# Patient Record
Sex: FEMALE | Race: WHITE | NOT HISPANIC OR LATINO | Employment: PART TIME | URBAN - METROPOLITAN AREA
[De-identification: names, ages, dates, MRNs, and addresses within clinical notes are randomized per-mention and may not be internally consistent; named-entity substitution may affect disease eponyms.]

---

## 2023-03-06 DIAGNOSIS — R92.8 ABNORMAL MAMMOGRAM: ICD-10-CM

## 2023-03-06 PROBLEM — R73.03 PREDIABETES: Status: ACTIVE | Noted: 2023-03-06

## 2023-03-06 RX ORDER — IBUPROFEN 600 MG/1
TABLET ORAL EVERY 6 HOURS
COMMUNITY
Start: 2021-08-19

## 2023-03-06 RX ORDER — ACETAMINOPHEN 325 MG/1
TABLET ORAL EVERY 6 HOURS
COMMUNITY
Start: 2021-08-19

## 2023-03-06 RX ORDER — DOCUSATE SODIUM 100 MG/1
CAPSULE, LIQUID FILLED ORAL 2 TIMES DAILY
COMMUNITY
Start: 2021-08-19 | End: 2023-08-02 | Stop reason: ALTCHOICE

## 2023-04-17 ENCOUNTER — APPOINTMENT (OUTPATIENT)
Dept: PEDIATRICS | Facility: CLINIC | Age: 43
End: 2023-04-17
Payer: COMMERCIAL

## 2023-07-14 ENCOUNTER — APPOINTMENT (OUTPATIENT)
Dept: PRIMARY CARE | Facility: CLINIC | Age: 43
End: 2023-07-14
Payer: COMMERCIAL

## 2023-08-02 ENCOUNTER — OFFICE VISIT (OUTPATIENT)
Dept: PRIMARY CARE | Facility: CLINIC | Age: 43
End: 2023-08-02
Payer: COMMERCIAL

## 2023-08-02 VITALS
BODY MASS INDEX: 41.78 KG/M2 | DIASTOLIC BLOOD PRESSURE: 80 MMHG | SYSTOLIC BLOOD PRESSURE: 110 MMHG | HEART RATE: 75 BPM | WEIGHT: 260 LBS | HEIGHT: 66 IN | OXYGEN SATURATION: 98 % | TEMPERATURE: 97.5 F

## 2023-08-02 DIAGNOSIS — J01.10 ACUTE NON-RECURRENT FRONTAL SINUSITIS: Primary | ICD-10-CM

## 2023-08-02 DIAGNOSIS — R92.8 OTHER ABNORMAL AND INCONCLUSIVE FINDINGS ON DIAGNOSTIC IMAGING OF BREAST: ICD-10-CM

## 2023-08-02 DIAGNOSIS — R73.03 PREDIABETES: ICD-10-CM

## 2023-08-02 DIAGNOSIS — Z00.00 ROUTINE GENERAL MEDICAL EXAMINATION AT A HEALTH CARE FACILITY: ICD-10-CM

## 2023-08-02 PROBLEM — M76.72 PERONEAL TENDINITIS OF LEFT LOWER LEG: Status: ACTIVE | Noted: 2023-08-02

## 2023-08-02 PROBLEM — M79.672 FOOT PAIN, BILATERAL: Status: ACTIVE | Noted: 2023-08-02

## 2023-08-02 PROBLEM — G57.82 NEURITIS OF LEFT SURAL NERVE: Status: ACTIVE | Noted: 2023-08-02

## 2023-08-02 PROBLEM — H61.21 IMPACTED CERUMEN OF RIGHT EAR: Status: ACTIVE | Noted: 2023-08-02

## 2023-08-02 PROBLEM — M79.671 FOOT PAIN, BILATERAL: Status: ACTIVE | Noted: 2023-08-02

## 2023-08-02 PROBLEM — M72.2 PLANTAR FASCIITIS: Status: ACTIVE | Noted: 2023-08-02

## 2023-08-02 PROCEDURE — 1036F TOBACCO NON-USER: CPT | Performed by: FAMILY MEDICINE

## 2023-08-02 PROCEDURE — 99396 PREV VISIT EST AGE 40-64: CPT | Performed by: FAMILY MEDICINE

## 2023-08-02 RX ORDER — AMOXICILLIN AND CLAVULANATE POTASSIUM 875; 125 MG/1; MG/1
875 TABLET, FILM COATED ORAL 2 TIMES DAILY
Qty: 20 TABLET | Refills: 0 | Status: SHIPPED | OUTPATIENT
Start: 2023-08-02 | End: 2023-08-12

## 2023-08-02 RX ORDER — PERPHENAZINE 16 MG
TABLET ORAL
COMMUNITY
Start: 2023-06-06 | End: 2023-08-02 | Stop reason: ALTCHOICE

## 2023-08-02 ASSESSMENT — PATIENT HEALTH QUESTIONNAIRE - PHQ9
SUM OF ALL RESPONSES TO PHQ9 QUESTIONS 1 & 2: 0
2. FEELING DOWN, DEPRESSED OR HOPELESS: NOT AT ALL
1. LITTLE INTEREST OR PLEASURE IN DOING THINGS: NOT AT ALL

## 2023-08-02 ASSESSMENT — PAIN SCALES - GENERAL: PAINLEVEL: 0-NO PAIN

## 2023-08-02 ASSESSMENT — LIFESTYLE VARIABLES
HOW OFTEN DO YOU HAVE SIX OR MORE DRINKS ON ONE OCCASION: NEVER
HOW OFTEN DO YOU HAVE A DRINK CONTAINING ALCOHOL: NEVER

## 2023-08-02 NOTE — PROGRESS NOTES
Subjective   Patient ID: Marilynn Nesbitt is a 43 y.o. female who presents for Sore Throat (Patient is here for sore throat x 5 days cough. Patient tested positive for Covid on July 11th and then over the weekend she started not to feel good again she tested this morning and it was NEG).  HPI  43-year-old female with bilateral sinus pressure, cough, congestion approximately 5 to 7 days.  Symptoms constant.  Symptoms getting worse.  She is also due for health maintenance blood work and mammogram.  She missed her original appointment due to COVID.  Review of Systems  Constitutional: no chills, no fever and no night sweats.   Eyes: no blurred vision and no eyesight problems.   ENT: the ears feel full, nasal congestion, postnasal drip and sinus pressure, but no tinnitus, no vertigo and no sore throat.   Cardiovascular: no chest pain, no intermittent leg claudication, no lower extremity edema, no palpitations and no syncope.   Respiratory: productive cough, no shortness of breath during exertion, no shortness of breath at rest and no wheezing.   Musculoskeletal: no arthralgias and no myalgias.   Neurological: no headache.   Hematologic/Lymphatic: no recurrent infections and no swollen glands.  Visit Vitals  /80 (BP Location: Left arm, Patient Position: Sitting, BP Cuff Size: Adult)   Pulse 75   Temp 36.4 °C (97.5 °F) (Temporal)        Objective   Physical Exam  Constitutional: Alert and in no acute distress. Well developed, well nourished.   Head and Face: Palpation of the face and sinuses: Abnormal.  Examination of the Sinuses: right maxillary tenderness, left maxillary tenderness, right frontal tenderness and left frontal tenderness.   Eyes: Pupils were equal in size, round, reactive to light (PERRL) with normal accommodation and extraocular movements intact (EOMI).   Ears, Nose, Mouth, and Throat: Otoscopic examination: Normal. Oropharynx: Abnormal.  Postnasal drainage.   Cardiovascular: Heart rate and rhythm were  normal, normal S1 and S2, no gallops, no murmurs and no pericardial rub.   Pulmonary: No respiratory distress. Clear bilateral breath sounds.   Lymphatic: No cervical lymphadenopathy.  Assessment/Plan   Problem List Items Addressed This Visit       Prediabetes    Relevant Orders    Hemoglobin A1c    Acute non-recurrent frontal sinusitis - Primary    Relevant Medications    amoxicillin-pot clavulanate (Augmentin) 875-125 mg tablet    Routine general medical examination at a health care facility    Relevant Orders    Lipid Panel    Comprehensive Metabolic Panel    CBC and Auto Differential    Urinalysis with Reflex Microscopic    BI mammo bilateral screening tomosynthesis

## 2023-08-30 ENCOUNTER — LAB (OUTPATIENT)
Dept: LAB | Facility: LAB | Age: 43
End: 2023-08-30
Payer: COMMERCIAL

## 2023-08-30 DIAGNOSIS — R92.8 ABNORMAL MAMMOGRAM: Primary | ICD-10-CM

## 2023-08-30 DIAGNOSIS — R73.03 PREDIABETES: ICD-10-CM

## 2023-08-30 DIAGNOSIS — Z00.00 ROUTINE GENERAL MEDICAL EXAMINATION AT A HEALTH CARE FACILITY: ICD-10-CM

## 2023-08-30 LAB
ALANINE AMINOTRANSFERASE (SGPT) (U/L) IN SER/PLAS: 14 U/L (ref 7–45)
ALBUMIN (G/DL) IN SER/PLAS: 4.2 G/DL (ref 3.4–5)
ALKALINE PHOSPHATASE (U/L) IN SER/PLAS: 59 U/L (ref 33–110)
AMORPHOUS CRYSTALS, URINE: ABNORMAL /HPF
ANION GAP IN SER/PLAS: 12 MMOL/L (ref 10–20)
APPEARANCE, URINE: ABNORMAL
ASPARTATE AMINOTRANSFERASE (SGOT) (U/L) IN SER/PLAS: 13 U/L (ref 9–39)
BACTERIA, URINE: ABNORMAL /HPF
BASOPHILS (10*3/UL) IN BLOOD BY AUTOMATED COUNT: 0.05 X10E9/L (ref 0–0.1)
BASOPHILS/100 LEUKOCYTES IN BLOOD BY AUTOMATED COUNT: 0.6 % (ref 0–2)
BILIRUBIN TOTAL (MG/DL) IN SER/PLAS: 0.6 MG/DL (ref 0–1.2)
BILIRUBIN, URINE: NEGATIVE
BLOOD, URINE: ABNORMAL
CALCIUM (MG/DL) IN SER/PLAS: 9.2 MG/DL (ref 8.6–10.3)
CARBON DIOXIDE, TOTAL (MMOL/L) IN SER/PLAS: 28 MMOL/L (ref 21–32)
CHLORIDE (MMOL/L) IN SER/PLAS: 101 MMOL/L (ref 98–107)
CHOLESTEROL (MG/DL) IN SER/PLAS: 139 MG/DL (ref 0–199)
CHOLESTEROL IN HDL (MG/DL) IN SER/PLAS: 60.7 MG/DL
CHOLESTEROL/HDL RATIO: 2.3
COLOR, URINE: ABNORMAL
CREATININE (MG/DL) IN SER/PLAS: 0.84 MG/DL (ref 0.5–1.05)
EOSINOPHILS (10*3/UL) IN BLOOD BY AUTOMATED COUNT: 0.13 X10E9/L (ref 0–0.7)
EOSINOPHILS/100 LEUKOCYTES IN BLOOD BY AUTOMATED COUNT: 1.5 % (ref 0–6)
ERYTHROCYTE DISTRIBUTION WIDTH (RATIO) BY AUTOMATED COUNT: 13.6 % (ref 11.5–14.5)
ERYTHROCYTE MEAN CORPUSCULAR HEMOGLOBIN CONCENTRATION (G/DL) BY AUTOMATED: 32.5 G/DL (ref 32–36)
ERYTHROCYTE MEAN CORPUSCULAR VOLUME (FL) BY AUTOMATED COUNT: 84 FL (ref 80–100)
ERYTHROCYTES (10*6/UL) IN BLOOD BY AUTOMATED COUNT: 4.68 X10E12/L (ref 4–5.2)
ESTIMATED AVERAGE GLUCOSE FOR HBA1C: 140 MG/DL
GFR FEMALE: 89 ML/MIN/1.73M2
GLUCOSE (MG/DL) IN SER/PLAS: 152 MG/DL (ref 74–99)
GLUCOSE, URINE: NEGATIVE MG/DL
HEMATOCRIT (%) IN BLOOD BY AUTOMATED COUNT: 39.4 % (ref 36–46)
HEMOGLOBIN (G/DL) IN BLOOD: 12.8 G/DL (ref 12–16)
HEMOGLOBIN A1C/HEMOGLOBIN TOTAL IN BLOOD: 6.5 %
IMMATURE GRANULOCYTES/100 LEUKOCYTES IN BLOOD BY AUTOMATED COUNT: 0.6 % (ref 0–0.9)
KETONES, URINE: NEGATIVE MG/DL
LDL: 60 MG/DL (ref 0–99)
LEUKOCYTE ESTERASE, URINE: ABNORMAL
LEUKOCYTES (10*3/UL) IN BLOOD BY AUTOMATED COUNT: 8.7 X10E9/L (ref 4.4–11.3)
LYMPHOCYTES (10*3/UL) IN BLOOD BY AUTOMATED COUNT: 2.27 X10E9/L (ref 1.2–4.8)
LYMPHOCYTES/100 LEUKOCYTES IN BLOOD BY AUTOMATED COUNT: 26.1 % (ref 13–44)
MONOCYTES (10*3/UL) IN BLOOD BY AUTOMATED COUNT: 0.56 X10E9/L (ref 0.1–1)
MONOCYTES/100 LEUKOCYTES IN BLOOD BY AUTOMATED COUNT: 6.4 % (ref 2–10)
MUCUS, URINE: ABNORMAL /LPF
NEUTROPHILS (10*3/UL) IN BLOOD BY AUTOMATED COUNT: 5.63 X10E9/L (ref 1.2–7.7)
NEUTROPHILS/100 LEUKOCYTES IN BLOOD BY AUTOMATED COUNT: 64.8 % (ref 40–80)
NITRITE, URINE: NEGATIVE
NRBC (PER 100 WBCS) BY AUTOMATED COUNT: 0 /100 WBC (ref 0–0)
PH, URINE: 7 (ref 5–8)
PLATELETS (10*3/UL) IN BLOOD AUTOMATED COUNT: 346 X10E9/L (ref 150–450)
POTASSIUM (MMOL/L) IN SER/PLAS: 4.1 MMOL/L (ref 3.5–5.3)
PROTEIN TOTAL: 6.8 G/DL (ref 6.4–8.2)
PROTEIN, URINE: ABNORMAL MG/DL
RBC, URINE: 6 /HPF (ref 0–5)
SODIUM (MMOL/L) IN SER/PLAS: 137 MMOL/L (ref 136–145)
SPECIFIC GRAVITY, URINE: 1.01 (ref 1–1.03)
SQUAMOUS EPITHELIAL CELLS, URINE: 12 /HPF
TRIGLYCERIDE (MG/DL) IN SER/PLAS: 93 MG/DL (ref 0–149)
UREA NITROGEN (MG/DL) IN SER/PLAS: 10 MG/DL (ref 6–23)
UROBILINOGEN, URINE: <2 MG/DL (ref 0–1.9)
VLDL: 19 MG/DL (ref 0–40)
WBC CLUMPS, URINE: ABNORMAL /HPF
WBC, URINE: 20 /HPF (ref 0–5)

## 2023-08-30 PROCEDURE — 81001 URINALYSIS AUTO W/SCOPE: CPT

## 2023-08-30 PROCEDURE — 83036 HEMOGLOBIN GLYCOSYLATED A1C: CPT

## 2023-08-30 PROCEDURE — 80061 LIPID PANEL: CPT

## 2023-08-30 PROCEDURE — 36415 COLL VENOUS BLD VENIPUNCTURE: CPT

## 2023-08-30 PROCEDURE — 80053 COMPREHEN METABOLIC PANEL: CPT

## 2023-08-30 PROCEDURE — 85025 COMPLETE CBC W/AUTO DIFF WBC: CPT

## 2023-09-01 ENCOUNTER — TELEPHONE (OUTPATIENT)
Dept: PRIMARY CARE | Facility: CLINIC | Age: 43
End: 2023-09-01
Payer: COMMERCIAL

## 2023-09-01 NOTE — TELEPHONE ENCOUNTER
Pt called and would like for you to give her a call when you get back into the office. This is regarding a lump on hr breast that they have been checking on for a year now.

## 2023-11-06 ENCOUNTER — APPOINTMENT (OUTPATIENT)
Dept: OTOLARYNGOLOGY | Facility: CLINIC | Age: 43
End: 2023-11-06
Payer: COMMERCIAL

## 2023-11-22 ENCOUNTER — OFFICE VISIT (OUTPATIENT)
Dept: OTOLARYNGOLOGY | Facility: CLINIC | Age: 43
End: 2023-11-22
Payer: COMMERCIAL

## 2023-11-22 VITALS
DIASTOLIC BLOOD PRESSURE: 94 MMHG | HEART RATE: 86 BPM | BODY MASS INDEX: 42.43 KG/M2 | TEMPERATURE: 97.9 F | WEIGHT: 264 LBS | OXYGEN SATURATION: 99 % | HEIGHT: 66 IN | RESPIRATION RATE: 16 BRPM | SYSTOLIC BLOOD PRESSURE: 147 MMHG

## 2023-11-22 DIAGNOSIS — H61.23 EXCESSIVE CERUMEN IN BOTH EAR CANALS: ICD-10-CM

## 2023-11-22 DIAGNOSIS — H93.8X3 SENSATION OF PLUGGED EAR ON BOTH SIDES: Primary | ICD-10-CM

## 2023-11-22 DIAGNOSIS — L29.9 EAR ITCHING: ICD-10-CM

## 2023-11-22 PROCEDURE — 1036F TOBACCO NON-USER: CPT | Performed by: NURSE PRACTITIONER

## 2023-11-22 PROCEDURE — 99213 OFFICE O/P EST LOW 20 MIN: CPT | Performed by: NURSE PRACTITIONER

## 2023-11-22 ASSESSMENT — PAIN SCALES - GENERAL: PAINLEVEL: 0-NO PAIN

## 2023-11-22 ASSESSMENT — PATIENT HEALTH QUESTIONNAIRE - PHQ9
SUM OF ALL RESPONSES TO PHQ9 QUESTIONS 1 AND 2: 0
1. LITTLE INTEREST OR PLEASURE IN DOING THINGS: NOT AT ALL
2. FEELING DOWN, DEPRESSED OR HOPELESS: NOT AT ALL

## 2023-11-22 ASSESSMENT — COLUMBIA-SUICIDE SEVERITY RATING SCALE - C-SSRS
6. HAVE YOU EVER DONE ANYTHING, STARTED TO DO ANYTHING, OR PREPARED TO DO ANYTHING TO END YOUR LIFE?: NO
1. IN THE PAST MONTH, HAVE YOU WISHED YOU WERE DEAD OR WISHED YOU COULD GO TO SLEEP AND NOT WAKE UP?: NO
2. HAVE YOU ACTUALLY HAD ANY THOUGHTS OF KILLING YOURSELF?: NO

## 2023-11-22 ASSESSMENT — ENCOUNTER SYMPTOMS
DEPRESSION: 0
OCCASIONAL FEELINGS OF UNSTEADINESS: 0
LOSS OF SENSATION IN FEET: 0

## 2023-11-22 NOTE — PROGRESS NOTES
"Subjective   Patient ID: Marilynn Nesbitt is a 43 y.o. female who presents for ear cleaning.    HPI  Patient here following up for her ears. She last had her right ear cleaned in November 2022. Ears have been itching and she has noticed some ear wax. The ears feel \"stuffy\". No ear pain.  History of ear tubes.     Patient Active Problem List   Diagnosis    Abnormal mammogram    Prediabetes    Foot pain, bilateral    Impacted cerumen of right ear    Neuritis of left sural nerve    Peroneal tendinitis of left lower leg    Plantar fasciitis    Acute non-recurrent frontal sinusitis    Routine general medical examination at a health care facility     Past Surgical History:   Procedure Laterality Date    BACK SURGERY  10/28/2019    Back Surgery    OTHER SURGICAL HISTORY  01/15/2015    Laparoscopy Myomectomy    OTHER SURGICAL HISTORY  09/02/2020    Gallbladder surgery    OTHER SURGICAL HISTORY  09/02/2020    Hand surgery     Review of Systems    All other systems have been reviewed and are negative for complaints except for those mentioned in history of present illness, past medical history and problem list.    Objective   Physical Exam  Right Ear: External inspection of ear with no deformity, scars, or masses. Ear canal is with non-occluding cerumen that was removed using the microscope, suction, and alligator forceps. After removal, TM is intact with no sign of infection, effusion, or retraction.      Left Ear: External inspection of ear with no deformity, scars, or masses. Ear canal is with non-occluding cerumen that was removed using the microscope, suction, and alligator forceps. After removal, TM is intact with no sign of infection, effusion, or retraction.      Assessment/Plan   Diagnoses and all orders for this visit:  Sensation of plugged ear on both sides, Excessive cerumen in both ear canals  Mild amounts of cerumen removed bilaterally. Reassurance given there is no cerumen impaction or infection. I recommend " obtaining an audiogram.   Ear itching  Use sweet oil or mineral oil in the ears 1-2 times weekly to help moisturize the ear canal and prevent itching.    Patient may follow up as needed.   All questions answered to patient satisfaction.

## 2024-02-19 ASSESSMENT — DERMATOLOGY QUALITY OF LIFE (QOL) ASSESSMENT
RATE HOW EMOTIONALLY BOTHERED YOU ARE BY YOUR SKIN PROBLEM (FOR EXAMPLE, WORRY, EMBARRASSMENT, FRUSTRATION): 3
WHAT SINGLE SKIN CONDITION LISTED BELOW IS THE PATIENT ANSWERING THE QUALITY-OF-LIFE ASSESSMENT QUESTIONS ABOUT: NONE OF THE ABOVE
WHAT SINGLE SKIN CONDITION LISTED BELOW IS THE PATIENT ANSWERING THE QUALITY-OF-LIFE ASSESSMENT QUESTIONS ABOUT: NONE OF THE ABOVE
RATE HOW BOTHERED YOU ARE BY EFFECTS OF YOUR SKIN PROBLEMS ON YOUR ACTIVITIES (EG, GOING OUT, ACCOMPLISHING WHAT YOU WANT, WORK ACTIVITIES OR YOUR RELATIONSHIPS WITH OTHERS): 0 - NEVER BOTHERED
RATE HOW BOTHERED YOU ARE BY SYMPTOMS OF YOUR SKIN PROBLEM (EG, ITCHING, STINGING BURNING, HURTING OR SKIN IRRITATION): 6 - ALWAYS BOTHERED
DATE THE QUALITY-OF-LIFE ASSESSMENT WAS COMPLETED: 66889
RATE HOW EMOTIONALLY BOTHERED YOU ARE BY YOUR SKIN PROBLEM (FOR EXAMPLE, WORRY, EMBARRASSMENT, FRUSTRATION): 3
RATE HOW BOTHERED YOU ARE BY EFFECTS OF YOUR SKIN PROBLEMS ON YOUR ACTIVITIES (EG, GOING OUT, ACCOMPLISHING WHAT YOU WANT, WORK ACTIVITIES OR YOUR RELATIONSHIPS WITH OTHERS): 0 - NEVER BOTHERED
DATE THE QUALITY-OF-LIFE ASSESSMENT WAS COMPLETED: 02/19/2024
RATE HOW BOTHERED YOU ARE BY SYMPTOMS OF YOUR SKIN PROBLEM (EG, ITCHING, STINGING BURNING, HURTING OR SKIN IRRITATION): 6 - ALWAYS BOTHERED

## 2024-02-20 ENCOUNTER — OFFICE VISIT (OUTPATIENT)
Dept: DERMATOLOGY | Facility: CLINIC | Age: 44
End: 2024-02-20
Payer: COMMERCIAL

## 2024-02-20 DIAGNOSIS — R21 RASH AND OTHER NONSPECIFIC SKIN ERUPTION: ICD-10-CM

## 2024-02-20 DIAGNOSIS — L30.9 DERMATITIS: Primary | ICD-10-CM

## 2024-02-20 PROCEDURE — 1036F TOBACCO NON-USER: CPT | Performed by: NURSE PRACTITIONER

## 2024-02-20 PROCEDURE — 99213 OFFICE O/P EST LOW 20 MIN: CPT | Performed by: NURSE PRACTITIONER

## 2024-02-20 RX ORDER — HYDROCORTISONE 1 %
CREAM (GRAM) TOPICAL 2 TIMES DAILY
COMMUNITY

## 2024-02-20 RX ORDER — TRIAMCINOLONE ACETONIDE 1 MG/G
CREAM TOPICAL
Qty: 453 G | Refills: 3 | Status: SHIPPED | OUTPATIENT
Start: 2024-02-20

## 2024-02-20 NOTE — PROGRESS NOTES
Subjective     Marilynn Nesbitt is a 43 y.o. female who presents for the following: Rash (Pt presents for examination of generalized rash. She states it is itching and red. She states she has been using hydrocortisone cream with minimal response. ).     Review of Systems:  No other skin or systemic complaints other than what is documented elsewhere in the note.    The following portions of the chart were reviewed this encounter and updated as appropriate:         Skin Cancer History  No skin cancer on file.      Specialty Problems    None       Objective   Well appearing patient in no apparent distress; mood and affect are within normal limits.    A full examination was performed including scalp, head, eyes, ears, nose, lips, neck, chest, axillae, abdomen, back, buttocks, bilateral upper extremities, bilateral lower extremities, hands, feet, fingers, toes, fingernails, and toenails. All findings within normal limits unless otherwise noted below.    Assessment/Plan   1. Dermatitis  Scaly erythematous papules and plaques with edema and vesiculation in a distribution consistent with contact to new laundry detergent. Worse on trunk and upper extremities.     - Irritant contact dermatitis is an inflammatory rash caused by direct physical or chemical injury to the skin. Unlike allergic contact dermatitis, which appears 24-72 hours after exposure to an allergen, the symptoms of irritant contact dermatitis can result within a few hours, especially with exposure to a strong irritant.  - Most individuals notice a burning or stinging sensation shortly after exposure to the irritant as well as a rash. The rash may then become itchy.  - Common irritants causing irritant contact dermatitis include soaps and detergents, solvents, mild acids, and chemicals with a high pH (alkalis), fiberglass, hair products, bleach, rubber gloves, and plants.  Try to avoid further exposure to the irritant, if known, or protect the skin from  re-exposure.  For irritated skin in body folds, consider applying a barrier cream with zinc oxide paste, such as Desitin.  - Apply petroleum jelly (eg, Vaseline) or a thick moisturizing cream (eg, CeraVe moisturizing cream) to wet skin after bathing or washing. Reapply at least twice daily to help further protect the affected skin areas.  - Start triamcinolone cream, use as directed.   - Please call the office if the condition worsens or fails to resolve.

## 2024-07-30 ENCOUNTER — APPOINTMENT (OUTPATIENT)
Dept: PRIMARY CARE | Facility: CLINIC | Age: 44
End: 2024-07-30
Payer: COMMERCIAL

## 2024-08-23 ENCOUNTER — LAB (OUTPATIENT)
Dept: LAB | Facility: LAB | Age: 44
End: 2024-08-23
Payer: COMMERCIAL

## 2024-08-23 ENCOUNTER — APPOINTMENT (OUTPATIENT)
Dept: PRIMARY CARE | Facility: CLINIC | Age: 44
End: 2024-08-23
Payer: COMMERCIAL

## 2024-08-23 VITALS — WEIGHT: 265 LBS | SYSTOLIC BLOOD PRESSURE: 110 MMHG | BODY MASS INDEX: 42.77 KG/M2 | DIASTOLIC BLOOD PRESSURE: 90 MMHG

## 2024-08-23 DIAGNOSIS — Z00.00 HEALTH CARE MAINTENANCE: ICD-10-CM

## 2024-08-23 DIAGNOSIS — E55.9 VITAMIN D DEFICIENCY: ICD-10-CM

## 2024-08-23 DIAGNOSIS — Z12.39 ENCOUNTER FOR SCREENING FOR MALIGNANT NEOPLASM OF BREAST, UNSPECIFIED SCREENING MODALITY: ICD-10-CM

## 2024-08-23 DIAGNOSIS — E66.01 CLASS 3 SEVERE OBESITY DUE TO EXCESS CALORIES WITHOUT SERIOUS COMORBIDITY IN ADULT, UNSPECIFIED BMI (MULTI): ICD-10-CM

## 2024-08-23 DIAGNOSIS — E66.01 CLASS 3 SEVERE OBESITY DUE TO EXCESS CALORIES WITHOUT SERIOUS COMORBIDITY IN ADULT, UNSPECIFIED BMI (MULTI): Primary | ICD-10-CM

## 2024-08-23 DIAGNOSIS — L30.4 INTERTRIGO: ICD-10-CM

## 2024-08-23 PROBLEM — E66.813 CLASS 3 SEVERE OBESITY DUE TO EXCESS CALORIES WITHOUT SERIOUS COMORBIDITY IN ADULT: Status: ACTIVE | Noted: 2024-08-23

## 2024-08-23 LAB
25(OH)D3 SERPL-MCNC: 32 NG/ML (ref 30–100)
ALBUMIN SERPL BCP-MCNC: 4.3 G/DL (ref 3.4–5)
ALP SERPL-CCNC: 73 U/L (ref 33–110)
ALT SERPL W P-5'-P-CCNC: 13 U/L (ref 7–45)
ANION GAP SERPL CALC-SCNC: 13 MMOL/L (ref 10–20)
AST SERPL W P-5'-P-CCNC: 11 U/L (ref 9–39)
BILIRUB SERPL-MCNC: 0.5 MG/DL (ref 0–1.2)
BUN SERPL-MCNC: 14 MG/DL (ref 6–23)
CALCIUM SERPL-MCNC: 9.4 MG/DL (ref 8.6–10.6)
CHLORIDE SERPL-SCNC: 104 MMOL/L (ref 98–107)
CHOLEST SERPL-MCNC: 141 MG/DL (ref 0–199)
CHOLESTEROL/HDL RATIO: 2.7
CO2 SERPL-SCNC: 25 MMOL/L (ref 21–32)
CREAT SERPL-MCNC: 0.78 MG/DL (ref 0.5–1.05)
EGFRCR SERPLBLD CKD-EPI 2021: >90 ML/MIN/1.73M*2
ERYTHROCYTE [DISTWIDTH] IN BLOOD BY AUTOMATED COUNT: 13.7 % (ref 11.5–14.5)
EST. AVERAGE GLUCOSE BLD GHB EST-MCNC: 126 MG/DL
GLUCOSE SERPL-MCNC: 94 MG/DL (ref 74–99)
HBA1C MFR BLD: 6 %
HCT VFR BLD AUTO: 40.6 % (ref 36–46)
HDLC SERPL-MCNC: 52.9 MG/DL
HGB BLD-MCNC: 12.9 G/DL (ref 12–16)
LDLC SERPL CALC-MCNC: 67 MG/DL
MCH RBC QN AUTO: 27 PG (ref 26–34)
MCHC RBC AUTO-ENTMCNC: 31.8 G/DL (ref 32–36)
MCV RBC AUTO: 85 FL (ref 80–100)
NON HDL CHOLESTEROL: 88 MG/DL (ref 0–149)
NRBC BLD-RTO: 0 /100 WBCS (ref 0–0)
PLATELET # BLD AUTO: 365 X10*3/UL (ref 150–450)
POTASSIUM SERPL-SCNC: 4.1 MMOL/L (ref 3.5–5.3)
PROT SERPL-MCNC: 7.3 G/DL (ref 6.4–8.2)
RBC # BLD AUTO: 4.77 X10*6/UL (ref 4–5.2)
SODIUM SERPL-SCNC: 138 MMOL/L (ref 136–145)
TRIGL SERPL-MCNC: 107 MG/DL (ref 0–149)
TSH SERPL-ACNC: 1.46 MIU/L (ref 0.44–3.98)
VLDL: 21 MG/DL (ref 0–40)
WBC # BLD AUTO: 8.6 X10*3/UL (ref 4.4–11.3)

## 2024-08-23 PROCEDURE — 36415 COLL VENOUS BLD VENIPUNCTURE: CPT

## 2024-08-23 PROCEDURE — 99386 PREV VISIT NEW AGE 40-64: CPT | Performed by: STUDENT IN AN ORGANIZED HEALTH CARE EDUCATION/TRAINING PROGRAM

## 2024-08-23 PROCEDURE — 1036F TOBACCO NON-USER: CPT | Performed by: STUDENT IN AN ORGANIZED HEALTH CARE EDUCATION/TRAINING PROGRAM

## 2024-08-23 RX ORDER — NYSTATIN 100000 [USP'U]/G
1 POWDER TOPICAL 2 TIMES DAILY
Qty: 60 G | Refills: 1 | Status: SHIPPED | OUTPATIENT
Start: 2024-08-23 | End: 2025-08-23

## 2024-08-23 ASSESSMENT — PATIENT HEALTH QUESTIONNAIRE - PHQ9
1. LITTLE INTEREST OR PLEASURE IN DOING THINGS: NOT AT ALL
2. FEELING DOWN, DEPRESSED OR HOPELESS: NOT AT ALL
SUM OF ALL RESPONSES TO PHQ9 QUESTIONS 1 AND 2: 0

## 2024-08-23 ASSESSMENT — ENCOUNTER SYMPTOMS
GASTROINTESTINAL NEGATIVE: 1
PSYCHIATRIC NEGATIVE: 1
RESPIRATORY NEGATIVE: 1
CARDIOVASCULAR NEGATIVE: 1
CONSTITUTIONAL NEGATIVE: 1
MUSCULOSKELETAL NEGATIVE: 1

## 2024-08-23 NOTE — PROGRESS NOTES
Jillian ew and general check up and needs an order for mammogram      Subjective   Patient ID: Marilynn Nesbitt is a 44 y.o. female.    Patient is a 44-year-old female who regards past medical history of gestational diabetes however reports no other history who presents for establish care.  She regards that she is overall fairly healthy, and is currently not on any medications.  Was on metformin in the past for gestational diabetes however has been weaned off.  Otherwise, regards that she is overall doing well with no complaints.  States that she does need a referral for a mammogram as well as some routine blood work.  Intermittently does get rashes, and is itchy throughout the day.  Otherwise states no additional issues or concerns.      Past medical history as above  Past surgical history denies  Social history denies any alcohol drug tobacco use  Family history diabetes        Review of Systems   Constitutional: Negative.    HENT: Negative.     Respiratory: Negative.     Cardiovascular: Negative.    Gastrointestinal: Negative.    Musculoskeletal: Negative.    Psychiatric/Behavioral: Negative.         Objective Visit Vitals  /90   Wt 120 kg (265 lb)   BMI 42.77 kg/m²   Smoking Status Never   BSA 2.36 m²      Physical Exam  Constitutional:       General: She is not in acute distress.     Appearance: She is not ill-appearing.   Eyes:      Pupils: Pupils are equal, round, and reactive to light.   Cardiovascular:      Rate and Rhythm: Normal rate and regular rhythm.      Pulses: Normal pulses.      Heart sounds: No murmur heard.  Pulmonary:      Effort: No respiratory distress.      Breath sounds: No wheezing.   Abdominal:      General: Abdomen is flat. Bowel sounds are normal. There is no distension.   Musculoskeletal:      Right lower leg: No edema.      Left lower leg: No edema.   Skin:     General: Skin is warm and dry.   Neurological:      Mental Status: She is alert. Mental status is at baseline.       Cranial Nerves: No cranial nerve deficit.      Motor: No weakness.   Psychiatric:         Mood and Affect: Mood normal.         Behavior: Behavior normal.         Assessment/Plan   Diagnoses and all orders for this visit:  Class 3 severe obesity due to excess calories without serious comorbidity in adult, unspecified BMI (Multi)  -     TSH with reflex to Free T4 if abnormal; Future  -     Hemoglobin A1C; Future  Vitamin D deficiency  -     Vitamin D 25-Hydroxy,Total (for eval of Vitamin D levels); Future  Encounter for screening for malignant neoplasm of breast, unspecified screening modality  -     BI mammo bilateral screening tomosynthesis; Future  Health care maintenance  -     CBC; Future  -     Comprehensive Metabolic Panel; Future  -     Lipid panel; Future  Intertrigo  -     nystatin (Mycostatin) 100,000 unit/gram powder; Apply 1 Application topically 2 times a day.    Patient seen for establish care    #Obesity  Discussed diet and excise modifications    #Abnormal mammograms  Previously tentative plans for routine screenings today advise [facility] referral    #Itching  Suspect underlying intertrigo, recommend nystatin, as needed Benadryl Derm referral if no improvement      #Health maintenance  Advised age-appropriate vaccinations  Routine labs today  Mammogram referral  [facility] referral      Return to care yearly or as needed

## 2024-08-31 ENCOUNTER — HOSPITAL ENCOUNTER (OUTPATIENT)
Dept: RADIOLOGY | Facility: CLINIC | Age: 44
Discharge: HOME | End: 2024-08-31
Payer: COMMERCIAL

## 2024-08-31 VITALS — HEIGHT: 66 IN | BODY MASS INDEX: 42.59 KG/M2 | WEIGHT: 265 LBS

## 2024-08-31 DIAGNOSIS — Z12.39 ENCOUNTER FOR SCREENING FOR MALIGNANT NEOPLASM OF BREAST, UNSPECIFIED SCREENING MODALITY: ICD-10-CM

## 2024-08-31 PROCEDURE — 77067 SCR MAMMO BI INCL CAD: CPT | Performed by: RADIOLOGY

## 2024-08-31 PROCEDURE — 77063 BREAST TOMOSYNTHESIS BI: CPT | Performed by: RADIOLOGY

## 2024-08-31 PROCEDURE — 77067 SCR MAMMO BI INCL CAD: CPT

## 2024-09-10 ENCOUNTER — OFFICE VISIT (OUTPATIENT)
Dept: PRIMARY CARE | Facility: CLINIC | Age: 44
End: 2024-09-10
Payer: COMMERCIAL

## 2024-09-10 VITALS — DIASTOLIC BLOOD PRESSURE: 80 MMHG | WEIGHT: 267 LBS | SYSTOLIC BLOOD PRESSURE: 126 MMHG | BODY MASS INDEX: 43.09 KG/M2

## 2024-09-10 DIAGNOSIS — L03.90 CELLULITIS, UNSPECIFIED CELLULITIS SITE: Primary | ICD-10-CM

## 2024-09-10 DIAGNOSIS — B37.2 CANDIDAL INTERTRIGO: ICD-10-CM

## 2024-09-10 DIAGNOSIS — E11.9 TYPE 2 DIABETES MELLITUS WITHOUT COMPLICATION, UNSPECIFIED WHETHER LONG TERM INSULIN USE (MULTI): ICD-10-CM

## 2024-09-10 PROCEDURE — 1036F TOBACCO NON-USER: CPT | Performed by: INTERNAL MEDICINE

## 2024-09-10 PROCEDURE — 3074F SYST BP LT 130 MM HG: CPT | Performed by: INTERNAL MEDICINE

## 2024-09-10 PROCEDURE — 3079F DIAST BP 80-89 MM HG: CPT | Performed by: INTERNAL MEDICINE

## 2024-09-10 PROCEDURE — 99215 OFFICE O/P EST HI 40 MIN: CPT | Performed by: INTERNAL MEDICINE

## 2024-09-10 PROCEDURE — 3048F LDL-C <100 MG/DL: CPT | Performed by: INTERNAL MEDICINE

## 2024-09-10 PROCEDURE — 3044F HG A1C LEVEL LT 7.0%: CPT | Performed by: INTERNAL MEDICINE

## 2024-09-10 RX ORDER — NYSTATIN 100000 U/G
CREAM TOPICAL 2 TIMES DAILY
Qty: 30 G | Refills: 2 | Status: SHIPPED | OUTPATIENT
Start: 2024-09-10 | End: 2024-10-10

## 2024-09-10 RX ORDER — DOXYCYCLINE 100 MG/1
100 CAPSULE ORAL 2 TIMES DAILY
Qty: 14 CAPSULE | Refills: 2 | Status: SHIPPED | OUTPATIENT
Start: 2024-09-10 | End: 2024-09-17

## 2024-09-10 RX ORDER — FLUCONAZOLE 100 MG/1
100 TABLET ORAL DAILY
Qty: 7 TABLET | Refills: 2 | Status: SHIPPED | OUTPATIENT
Start: 2024-09-10 | End: 2024-09-17

## 2024-09-10 RX ORDER — NYSTATIN 100000 [USP'U]/G
1 POWDER TOPICAL 2 TIMES DAILY
Qty: 60 G | Refills: 2 | Status: SHIPPED | OUTPATIENT
Start: 2024-09-10 | End: 2025-09-10

## 2024-09-10 NOTE — PROGRESS NOTES
Subjective   Patient ID: Marilynn Nesbitt is a 44 y.o. female who presents for itchiness in the groin area (Some weeping also).  HPI  Past medical history type 2 diabetes ADHD    Presents with son Kd    Patient describes severe rash for the last 4 to 5 months over the whole summer in her groin area some onto her lower abdomen as well posterior back pruritic grade 7 out of 10 getting a little bit better over the last day or 2 when she has been putting a fan on the area and using topical nystatin worse without it in the warm months with sweating  Some pain from scratching it excoriated  Occasionally drains clear to yellow fluid  Odor with it  Denies fever chills genital rash perineal rash              Health Maintenance:      Colonoscopy:      Mammogram:      Pelvic/Pap:      Low dose chest CT:      Aorta duplex:      Optho:      Podiatry:        Vaccines:      Refer to Epic Vaccination Log        ROS:      General: denies fever/chills/weight loss      Head: denies HA/trauma/masses/dizziness      Eyes: denies vision change/loss of vision/blurry vision/diplopia/eye pain      Ears: denies hearing loss/tinnitus/otalgia/otorrhea      Nose: denies nasal drainage/anosmia      Throat: denies dysphagia/odynophagia      Lymphatics: denies lymph node swelling      Breast: denies masses/discharge/dimpling/skin changes      Cardiac: denies CP/palpitations/orthopnea/PND      Pulmonary: denies dyspnea/cough/wheezing      GI: denies abd pain/n/v/diarrhea/melena/hematochezia/hematemesis      : denies dysuria/hematuria/change frequency      Genital: denies genital discharge/lesions      Skin: Spreading rash intertriginous groin abdomen denies rashes/lesions/masses      MSK: denies weakness/swelling/edema/gait imbalance/pain      Neuro: denies paresthesias/seizures/dysarthria      Psych: denies depression/anxiety/suicidal or homicidal ideations            Objective   /80   Wt 121 kg (267 lb)   BMI 43.09 kg/m²      Physical  "Exam:     General: AO3, NAD     Head: atraumatic/NC     Eyes: EOMI/PERRLA. Negative APD     Ears: TM pearly gray, EAC clear. No lesions or erythema     Nose: symmetric nares, no discharge     Throat: trachea midline, uvula midline pink mucosa. No thyromegaly     Lymphatics: no cervical/supraclavicular/ant or posterior cervical adenopathy/axillary/inguinal adenopathy     Breast: not examined     Chest: no deformity or tenderness to palpation     Pulm: CTA b/l, no wheeze/rhonchi/rales. nonlabored     Cardiac: RRR +s1s2, no m/r/g.      GI: soft, NT/ND. Normoactive Bsx4. No rebound/guarding.     Rectal: not examined     Genital: No rash on the vagina or perineum     MSK: 5/5 strength UE LE. No edema/clubbing/cyanosis     Skin: Satellite lesions intertriginous area lower abdomen erythema patchy left suprapubic mild warmth excoriation lower abdomen satellite erythematous lesions as well no fluctuance induration no rashes/lesions     Vascular: 2+ palp DP PT radials b/l. Negative carotid bruit     Neuro: CNII-XII intact. No focal deficits. Reflexes 2/4 brachioradialis bicep tricep patellar achilles. Finger to nose intact.     Psych: appropriate mood/affect                    No results found for: \"BMPR1A\", \"CBCDIF\"      Assessment/Plan   Diagnoses and all orders for this visit:  Cellulitis, unspecified cellulitis site  -     doxycycline (Vibramycin) 100 mg capsule; Take 1 capsule (100 mg) by mouth 2 times a day for 7 days. Take with at least 8 ounces (large glass) of water, do not lie down for 30 minutes after  Candidal intertrigo  -     fluconazole (Diflucan) 100 mg tablet; Take 1 tablet (100 mg) by mouth once daily for 7 days.  -     nystatin (Mycostatin) cream; Apply topically 2 times a day.  -     nystatin (Mycostatin) 100,000 unit/gram powder; Apply 1 Application topically 2 times a day.  Type 2 diabetes mellitus without complication, unspecified whether long term insulin use (Multi)    Keep the area dry as we " discussed take a shower pat the area dry after rinsing with soap and water then apply the topical cream nystatin let that dry with a fan and then apply the nystatin powder after that    Go to the ER for any spreading redness fever chills    Please call and follow-up with your established dermatologist as well may need a scraping if persist    Thank you for making appointment today Marilynn    Please follow-up 2 weeks    Pipe Mock DO, Nyla Orozco in groin area with weeping

## 2024-09-16 NOTE — PROGRESS NOTES
"Annual Exam    Pap: 10/19/2022 nml HPV-  Vicki: 2024 Cat 2 - PCP  LMP: 2024  CC: None     Sudha Medel MA II     GYN ANNUAL EXAM    SUBJECTIVE    Marilynn Nesbitt is a 44 y.o. female who presents for annual exam today.  Her periods are monthly and fairly regular.  They last 3-5 days in total, but the first day is very heavy.  This seems to have changed over the past few years.    She is not using contraception and is happy with this for now as she is not having sex with her  at this time.  She has no complaints today.      PMH - obesity     PSH - c/s x 2, myomectomy     OB history -   OB History    Para Term  AB Living   2 2 2         SAB IAB Ectopic Multiple Live Births                  # Outcome Date GA Lbr Alan/2nd Weight Sex Type Anes PTL Lv   2 Term            1 Term              Last pap -   Normal HPV Negative-      Last mammogram - 2024 - BIRADs 2    Family history of breast or ovarian cancer - maternal aunt - breast cancer, no FH of ovarian cancer     OBJECTIVE  /79 (BP Location: Right arm, Patient Position: Sitting)   Pulse 66   Ht 1.676 m (5' 6\")   Wt 120 kg (265 lb 3.2 oz)   LMP 2024 (Exact Date)   SpO2 98%   BMI 42.80 kg/m²     General Appearance   - consistent with stated age, well groomed and cooperative    Integumentary  - skin warm and dry without rash    Head and Neck  - normalocephalic and neck supple    Chest and Lung Exam  - normal breathing effort, no respiratory distress    Breast  - symmetry noted, no mass palpable, no skin change and no nipple discharge.    Abdomen  - soft, nontender and no hepatomegaly, splenomegaly, or mass    Female Genitourinary  - vulva normal without rash or lesion, normal vaginal rugae, normal appearing urethra, no vaginal discharge, uterus normal size & no palpable masses, no adnexal mass, no adnexal tenderness, no cervical motion tenderness; bimanual exam limited by patient's body habitus     Peripheral Vascular  - " no edema present    ASSESSMENT/PLAN  44 y.o.  female who presents for annual exam.       Actions performed during this visit include:  - Clinical breast exam  - Clinical pelvic exam  - Pap- UTD and not indicated   - Mammogram- Had BIRADs 2 mammogram last month.   - Colon cancer screening- nor yet indicated   - Contraception - Declines  - Heavy periods - Overall, period is regular and relatively short but much heavier on the first day over the past 1-2 years.  Has h/o fibroids.  Pelvic USN ordered.  Pt will RTC to review USN and discuss management options.   - STI screening  - Declines     Please return for your next visit in 1 year or sooner as needed.     Viktoria Jon MD

## 2024-09-17 ENCOUNTER — APPOINTMENT (OUTPATIENT)
Dept: OBSTETRICS AND GYNECOLOGY | Facility: CLINIC | Age: 44
End: 2024-09-17
Payer: COMMERCIAL

## 2024-09-17 VITALS
HEART RATE: 66 BPM | DIASTOLIC BLOOD PRESSURE: 79 MMHG | HEIGHT: 66 IN | SYSTOLIC BLOOD PRESSURE: 115 MMHG | BODY MASS INDEX: 42.62 KG/M2 | OXYGEN SATURATION: 98 % | WEIGHT: 265.2 LBS

## 2024-09-17 DIAGNOSIS — Z01.419 WOMEN'S ANNUAL ROUTINE GYNECOLOGICAL EXAMINATION: Primary | ICD-10-CM

## 2024-09-17 PROCEDURE — 3048F LDL-C <100 MG/DL: CPT | Performed by: OBSTETRICS & GYNECOLOGY

## 2024-09-17 PROCEDURE — 99396 PREV VISIT EST AGE 40-64: CPT | Performed by: OBSTETRICS & GYNECOLOGY

## 2024-09-17 PROCEDURE — 3008F BODY MASS INDEX DOCD: CPT | Performed by: OBSTETRICS & GYNECOLOGY

## 2024-09-17 PROCEDURE — 3044F HG A1C LEVEL LT 7.0%: CPT | Performed by: OBSTETRICS & GYNECOLOGY

## 2024-09-17 PROCEDURE — 1036F TOBACCO NON-USER: CPT | Performed by: OBSTETRICS & GYNECOLOGY

## 2024-09-17 PROCEDURE — 3078F DIAST BP <80 MM HG: CPT | Performed by: OBSTETRICS & GYNECOLOGY

## 2024-09-17 PROCEDURE — 3074F SYST BP LT 130 MM HG: CPT | Performed by: OBSTETRICS & GYNECOLOGY

## 2024-09-17 SDOH — ECONOMIC STABILITY: FOOD INSECURITY: WITHIN THE PAST 12 MONTHS, YOU WORRIED THAT YOUR FOOD WOULD RUN OUT BEFORE YOU GOT MONEY TO BUY MORE.: NEVER TRUE

## 2024-09-17 SDOH — ECONOMIC STABILITY: FOOD INSECURITY: WITHIN THE PAST 12 MONTHS, THE FOOD YOU BOUGHT JUST DIDN'T LAST AND YOU DIDN'T HAVE MONEY TO GET MORE.: NEVER TRUE

## 2024-09-17 SDOH — HEALTH STABILITY: PHYSICAL HEALTH: ON AVERAGE, HOW MANY MINUTES DO YOU ENGAGE IN EXERCISE AT THIS LEVEL?: 0 MIN

## 2024-09-17 SDOH — ECONOMIC STABILITY: TRANSPORTATION INSECURITY
IN THE PAST 12 MONTHS, HAS THE LACK OF TRANSPORTATION KEPT YOU FROM MEDICAL APPOINTMENTS OR FROM GETTING MEDICATIONS?: NO

## 2024-09-17 SDOH — ECONOMIC STABILITY: TRANSPORTATION INSECURITY
IN THE PAST 12 MONTHS, HAS LACK OF TRANSPORTATION KEPT YOU FROM MEETINGS, WORK, OR FROM GETTING THINGS NEEDED FOR DAILY LIVING?: NO

## 2024-09-17 SDOH — HEALTH STABILITY: PHYSICAL HEALTH: ON AVERAGE, HOW MANY DAYS PER WEEK DO YOU ENGAGE IN MODERATE TO STRENUOUS EXERCISE (LIKE A BRISK WALK)?: 0 DAYS

## 2024-09-17 ASSESSMENT — PAIN SCALES - GENERAL: PAINLEVEL: 0-NO PAIN

## 2024-09-17 ASSESSMENT — SOCIAL DETERMINANTS OF HEALTH (SDOH): HOW HARD IS IT FOR YOU TO PAY FOR THE VERY BASICS LIKE FOOD, HOUSING, MEDICAL CARE, AND HEATING?: NOT HARD AT ALL

## 2024-09-25 ENCOUNTER — APPOINTMENT (OUTPATIENT)
Dept: PRIMARY CARE | Facility: CLINIC | Age: 44
End: 2024-09-25
Payer: COMMERCIAL

## 2024-09-25 VITALS — DIASTOLIC BLOOD PRESSURE: 74 MMHG | HEART RATE: 79 BPM | OXYGEN SATURATION: 97 % | SYSTOLIC BLOOD PRESSURE: 112 MMHG

## 2024-09-25 DIAGNOSIS — B37.2 CANDIDAL INTERTRIGO: Primary | ICD-10-CM

## 2024-09-25 PROCEDURE — 3078F DIAST BP <80 MM HG: CPT | Performed by: STUDENT IN AN ORGANIZED HEALTH CARE EDUCATION/TRAINING PROGRAM

## 2024-09-25 PROCEDURE — 3048F LDL-C <100 MG/DL: CPT | Performed by: STUDENT IN AN ORGANIZED HEALTH CARE EDUCATION/TRAINING PROGRAM

## 2024-09-25 PROCEDURE — 3044F HG A1C LEVEL LT 7.0%: CPT | Performed by: STUDENT IN AN ORGANIZED HEALTH CARE EDUCATION/TRAINING PROGRAM

## 2024-09-25 PROCEDURE — 3074F SYST BP LT 130 MM HG: CPT | Performed by: STUDENT IN AN ORGANIZED HEALTH CARE EDUCATION/TRAINING PROGRAM

## 2024-09-25 PROCEDURE — 99213 OFFICE O/P EST LOW 20 MIN: CPT | Performed by: STUDENT IN AN ORGANIZED HEALTH CARE EDUCATION/TRAINING PROGRAM

## 2024-09-25 PROCEDURE — 1036F TOBACCO NON-USER: CPT | Performed by: STUDENT IN AN ORGANIZED HEALTH CARE EDUCATION/TRAINING PROGRAM

## 2024-09-25 RX ORDER — KETOCONAZOLE 20 MG/G
CREAM TOPICAL 2 TIMES DAILY
Qty: 60 G | Refills: 1 | Status: SHIPPED | OUTPATIENT
Start: 2024-09-25 | End: 2024-10-23

## 2024-09-25 NOTE — PROGRESS NOTES
Subjective   Patient ID: Marilynn Nesbitt is a 44 y.o. female who presents for Follow-up.  [facility]  Marilynn is here for follow up visit.    Was seen in office for groin cellulitis/fungal infection. Completed course of PO fluconazole and doxycycline. She noticed significant improvement with completion of these medications. She has been continuing to use nystatin. Does continue to have some pruritic rash over left sided groin.    Review of Systems  12-point ROS was reviewed and is negative, unless otherwise noted in HPI    Objective   Vitals:    09/25/24 1135   BP: 112/74   Pulse: 79   SpO2: 97%      Physical Exam  GEN: alert, conversant, NAD  HEENT: PERRL, EOMI, MMM  NECK: supple, no LAD appreciated  CHEST: CTAB  CV: S1, S2, RRR, no murmurs appreciated  ABD: soft, NT, ND  EXT: no significant LE edema  SKIN: warm, dry. Candida over left groin    Assessment/Plan   #cellulitis s/p completion of antibiotic course  - much improved. No area of residual cellulitis at this time    #candida intertrigo  - given ketoconazole cream to use BID until resolution. Continue nystatin powder.  - switch from dial to dove soap  - follow up with established dermatologist    RTC as scheduled, sooner PRN     Abran Mejia DO

## 2025-03-31 ENCOUNTER — APPOINTMENT (OUTPATIENT)
Dept: PRIMARY CARE | Facility: CLINIC | Age: 45
End: 2025-03-31
Payer: COMMERCIAL

## 2025-03-31 VITALS — DIASTOLIC BLOOD PRESSURE: 78 MMHG | SYSTOLIC BLOOD PRESSURE: 116 MMHG | BODY MASS INDEX: 42.77 KG/M2 | WEIGHT: 265 LBS

## 2025-03-31 DIAGNOSIS — M67.40 GANGLION CYST: Primary | ICD-10-CM

## 2025-03-31 PROCEDURE — 1036F TOBACCO NON-USER: CPT | Performed by: STUDENT IN AN ORGANIZED HEALTH CARE EDUCATION/TRAINING PROGRAM

## 2025-03-31 PROCEDURE — 99213 OFFICE O/P EST LOW 20 MIN: CPT | Performed by: STUDENT IN AN ORGANIZED HEALTH CARE EDUCATION/TRAINING PROGRAM

## 2025-03-31 PROCEDURE — 3074F SYST BP LT 130 MM HG: CPT | Performed by: STUDENT IN AN ORGANIZED HEALTH CARE EDUCATION/TRAINING PROGRAM

## 2025-03-31 PROCEDURE — 3078F DIAST BP <80 MM HG: CPT | Performed by: STUDENT IN AN ORGANIZED HEALTH CARE EDUCATION/TRAINING PROGRAM

## 2025-03-31 RX ORDER — METHYLPREDNISOLONE 4 MG/1
TABLET ORAL
Qty: 21 TABLET | Refills: 0 | Status: SHIPPED | OUTPATIENT
Start: 2025-03-31 | End: 2025-04-06

## 2025-03-31 RX ORDER — MELOXICAM 15 MG/1
15 TABLET ORAL DAILY
Qty: 30 TABLET | Refills: 11 | Status: SHIPPED | OUTPATIENT
Start: 2025-03-31 | End: 2026-03-31

## 2025-03-31 ASSESSMENT — ENCOUNTER SYMPTOMS
MUSCULOSKELETAL NEGATIVE: 1
CARDIOVASCULAR NEGATIVE: 1
GASTROINTESTINAL NEGATIVE: 1
CONSTITUTIONAL NEGATIVE: 1
RESPIRATORY NEGATIVE: 1
PSYCHIATRIC NEGATIVE: 1

## 2025-03-31 NOTE — PROGRESS NOTES
SICK VISIT    Subjective   Patient ID: Marilynn Nesbitt is a 44 y.o. female.    Patient presents for a sick visit. Reports left sided mass on her left wrist that enlarges with use and hurts with use. Uses tylenol/ibuprofen for the pain. Has had surgery on that thumb in her 20s.      Review of Systems   Constitutional: Negative.    HENT: Negative.     Respiratory: Negative.     Cardiovascular: Negative.    Gastrointestinal: Negative.    Musculoskeletal: Negative.    Psychiatric/Behavioral: Negative.         Objective Visit Vitals  /78   Wt 120 kg (265 lb)   BMI 42.77 kg/m²   OB Status Having periods   Smoking Status Former   BSA 2.36 m²      Physical Exam  Constitutional:       General: She is not in acute distress.     Appearance: She is not ill-appearing.   Eyes:      Pupils: Pupils are equal, round, and reactive to light.   Cardiovascular:      Rate and Rhythm: Normal rate and regular rhythm.      Pulses: Normal pulses.      Heart sounds: No murmur heard.  Pulmonary:      Effort: No respiratory distress.      Breath sounds: No wheezing.   Abdominal:      General: Abdomen is flat. Bowel sounds are normal. There is no distension.   Musculoskeletal:      Right lower leg: No edema.      Left lower leg: No edema.      Comments: Dequervain's negative, phalens negative - small nodule present on the left cyst, tender   Skin:     General: Skin is warm and dry.   Neurological:      Mental Status: She is alert. Mental status is at baseline.      Cranial Nerves: No cranial nerve deficit.      Motor: No weakness.   Psychiatric:         Mood and Affect: Mood normal.         Behavior: Behavior normal.         Assessment/Plan   Diagnoses and all orders for this visit:  Ganglion cyst  -     Referral to Orthopedics and Sports Medicine; Future  -     meloxicam (Mobic) 15 mg tablet; Take 1 tablet (15 mg) by mouth once daily.      Patient seen for a sick visit.    #Ganglion Cyst?/Scar Tissue  #Left Wrist Nodule/Pain  -meloxicam  PRN, wrist brace   -referral to orthopaedic surgery    Rosetta Cruz MD  PGY-2 Internal Medicine  Plan is not finalized till staffed with the attending physician Dr. Monzon.    Patient seen in conjunction with resident physician, likely ganglion cyst noted, recommend meloxicam as needed as well as bracing referral to Ortho, all questions answered she will call if symptoms worsen or do not improve.

## 2025-04-02 ENCOUNTER — OFFICE VISIT (OUTPATIENT)
Dept: ORTHOPEDIC SURGERY | Facility: CLINIC | Age: 45
End: 2025-04-02
Payer: COMMERCIAL

## 2025-04-02 DIAGNOSIS — M67.40 GANGLION CYST: ICD-10-CM

## 2025-04-02 PROCEDURE — 99203 OFFICE O/P NEW LOW 30 MIN: CPT | Performed by: ORTHOPAEDIC SURGERY

## 2025-04-02 PROCEDURE — 1036F TOBACCO NON-USER: CPT | Performed by: ORTHOPAEDIC SURGERY

## 2025-04-02 PROCEDURE — 99213 OFFICE O/P EST LOW 20 MIN: CPT | Performed by: ORTHOPAEDIC SURGERY

## 2025-04-02 NOTE — PROGRESS NOTES
Subjective    Patient ID: Marliynn Nesbitt is a 44 y.o. female.    Chief Complaint: Ganglion Cyst of the Left Wrist (NPV PT COMES IN W/ CONCERNS FOR A L LATERAL WRIST GANGLION X 1 YEAR NKI/+PAIN/PT DOES NOTE HX OF PREVIOUS LEFT THUMB/WRIST TENDON REPAIR IN HER 20'S)     Last Surgery: No surgery found  Last Surgery Date: No surgery found    HPI  The patient is a left-hand-dominant female who comes in with a complaint of what is likely a cyst on her left wrist that has been present for approximately 1 year.  She states that she notices swelling along the volar aspect of her volar left wrist that has been both increasing and decreasing in size.  When it is larger in size she will have more pain in this area.  She has had a previous left thumb ulnar collateral ligament reconstruction by different surgeon about 20 years ago.  She does not recall any injuries to this portion of her wrist.  Objective   Ortho Exam  The patient is in no acute distress.  Exam of her left hand and wrist reveals she has a well-healed scar from her previous thumb UCL reconstruction site.  She has appropriate function of her EPL and FPL.  She has a negative Finkelstein test.  She does have a volar ganglion that is less than 1 cm in diameter.  It is located adjacent to the radial artery.  The cyst itself is nonpulsatile.  There is no warmth erythema.    Assessment/Plan   Encounter Diagnoses:  Ganglion cyst    The patient has a left wrist volar ganglion cyst.  Given its small size and proximity to the radial artery explained the patient the treatment options at this time is observation or surgical excision as aspiration would be risky.  At this time the patient will observe it.  She will follow-up as her symptoms dictate.

## 2025-07-22 ENCOUNTER — TELEMEDICINE (OUTPATIENT)
Dept: PRIMARY CARE | Facility: CLINIC | Age: 45
End: 2025-07-22
Payer: COMMERCIAL

## 2025-07-22 DIAGNOSIS — H10.9 CONJUNCTIVITIS, UNSPECIFIED CONJUNCTIVITIS TYPE, UNSPECIFIED LATERALITY: ICD-10-CM

## 2025-07-22 DIAGNOSIS — T78.40XS ALLERGIC DISORDER, SEQUELA: ICD-10-CM

## 2025-07-22 DIAGNOSIS — J01.10 ACUTE NON-RECURRENT FRONTAL SINUSITIS: Primary | ICD-10-CM

## 2025-07-22 PROCEDURE — 99213 OFFICE O/P EST LOW 20 MIN: CPT | Performed by: STUDENT IN AN ORGANIZED HEALTH CARE EDUCATION/TRAINING PROGRAM

## 2025-07-22 RX ORDER — AMOXICILLIN AND CLAVULANATE POTASSIUM 875; 125 MG/1; MG/1
875 TABLET, FILM COATED ORAL 2 TIMES DAILY
Qty: 14 TABLET | Refills: 0 | Status: SHIPPED | OUTPATIENT
Start: 2025-07-22 | End: 2025-07-29

## 2025-07-22 RX ORDER — METHYLPREDNISOLONE 4 MG/1
TABLET ORAL
Qty: 21 TABLET | Refills: 0 | Status: SHIPPED | OUTPATIENT
Start: 2025-07-22 | End: 2025-07-28

## 2025-07-22 RX ORDER — CIPROFLOXACIN HYDROCHLORIDE 3 MG/ML
1 SOLUTION/ DROPS OPHTHALMIC
Qty: 10 ML | Refills: 0 | Status: SHIPPED | OUTPATIENT
Start: 2025-07-22 | End: 2025-08-01

## 2025-07-22 ASSESSMENT — ENCOUNTER SYMPTOMS
CONSTITUTIONAL NEGATIVE: 1
CARDIOVASCULAR NEGATIVE: 1
RESPIRATORY NEGATIVE: 1
GASTROINTESTINAL NEGATIVE: 1
NEUROLOGICAL NEGATIVE: 1
EYE REDNESS: 1
EYE DISCHARGE: 1
SINUS PRESSURE: 1
SINUS PAIN: 1
MUSCULOSKELETAL NEGATIVE: 1
PSYCHIATRIC NEGATIVE: 1

## 2025-07-22 NOTE — PROGRESS NOTES
Subjective   Patient ID: Marilynn Nesbitt is a 44 y.o. female who presents for head congestion (H/A, left eye red).    Patient seen on virtual evaluation.  Regards that she is overall doing well, however, recently was cleaning her bathroom with multiple detergents and products, and afterwards, started to get significantly congested, and started to have some eye drainage.  States that her symptoms have been persistent despite conservative therapy.  Did notice some crusty eye drainage recently.  Otherwise, states that symptoms have been going on for the past 4 to 5 days.  Denies any additional issues.  No fevers.         Review of Systems   Constitutional: Negative.    HENT:  Positive for postnasal drip, sinus pressure and sinus pain.    Eyes:  Positive for discharge and redness.   Respiratory: Negative.     Cardiovascular: Negative.    Gastrointestinal: Negative.    Musculoskeletal: Negative.    Neurological: Negative.    Psychiatric/Behavioral: Negative.         Objective   There were no vitals taken for this visit.    Physical Exam deferred due to virtual evaluation    Assessment/Plan   Problem List Items Addressed This Visit           ICD-10-CM    Acute non-recurrent frontal sinusitis - Primary J01.10     Other Visit Diagnoses         Codes      Allergic disorder, sequela     T78.40XS    Relevant Medications    methylPREDNISolone (Medrol Dospak) 4 mg tablets      Conjunctivitis, unspecified conjunctivitis type, unspecified laterality     H10.9    Relevant Medications    ciprofloxacin (Ciloxan) 0.3 % ophthalmic solution    amoxicillin-clavulanate (Augmentin) 875-125 mg tablet          Patient seen on virtual evaluation    #Conjunctivitis  #Allergic reaction  Patient likely has reaction from the chewables, will plan Medrol Dosepak, as well as Cipro eyedrops for folliculitis, if this continues to worsen, recommend switching to Augmentin.  Patient agreeable with plan, will call if symptoms worsen and do not  improve    Return to care as previous scheduled or as needed

## 2025-09-23 ENCOUNTER — APPOINTMENT (OUTPATIENT)
Dept: OBSTETRICS AND GYNECOLOGY | Facility: CLINIC | Age: 45
End: 2025-09-23
Payer: COMMERCIAL

## 2025-09-26 ENCOUNTER — APPOINTMENT (OUTPATIENT)
Dept: PRIMARY CARE | Facility: CLINIC | Age: 45
End: 2025-09-26
Payer: COMMERCIAL

## 2025-12-01 ENCOUNTER — APPOINTMENT (OUTPATIENT)
Dept: OBSTETRICS AND GYNECOLOGY | Facility: CLINIC | Age: 45
End: 2025-12-01
Payer: COMMERCIAL